# Patient Record
Sex: FEMALE | Race: BLACK OR AFRICAN AMERICAN | NOT HISPANIC OR LATINO | Employment: FULL TIME | RURAL
[De-identification: names, ages, dates, MRNs, and addresses within clinical notes are randomized per-mention and may not be internally consistent; named-entity substitution may affect disease eponyms.]

---

## 2020-08-19 ENCOUNTER — HISTORICAL (OUTPATIENT)
Dept: ADMINISTRATIVE | Facility: HOSPITAL | Age: 50
End: 2020-08-19

## 2020-08-19 LAB — SARS-COV+SARS-COV-2 AG RESP QL IA.RAPID: NEGATIVE

## 2021-01-28 ENCOUNTER — HISTORICAL (OUTPATIENT)
Dept: ADMINISTRATIVE | Facility: HOSPITAL | Age: 51
End: 2021-01-28

## 2021-01-28 LAB — SARS-COV+SARS-COV-2 AG RESP QL IA.RAPID: NEGATIVE

## 2021-02-09 ENCOUNTER — HISTORICAL (OUTPATIENT)
Dept: ADMINISTRATIVE | Facility: HOSPITAL | Age: 51
End: 2021-02-09

## 2021-02-09 LAB
ALBUMIN SERPL BCP-MCNC: 3.7 G/DL (ref 3.5–5)
ALBUMIN/GLOB SERPL: 1 {RATIO}
ALP SERPL-CCNC: 132 U/L (ref 41–108)
ALT SERPL W P-5'-P-CCNC: 19 U/L (ref 13–56)
ANION GAP SERPL CALCULATED.3IONS-SCNC: 10.9 MMOL/L (ref 7–16)
AST SERPL W P-5'-P-CCNC: 17 U/L (ref 15–37)
BACTERIA #/AREA URNS HPF: ABNORMAL /HPF
BASOPHILS # BLD AUTO: 0.05 X10E3/UL (ref 0–0.2)
BASOPHILS NFR BLD AUTO: 0.8 % (ref 0–1)
BILIRUB SERPL-MCNC: 0.3 MG/DL (ref 0–1.2)
BILIRUB UR QL STRIP: NEGATIVE MG/DL
BUN SERPL-MCNC: 9 MG/DL (ref 7–18)
BUN/CREAT SERPL: 11
CALCIUM SERPL-MCNC: 8.2 MG/DL (ref 8.5–10.1)
CHLORIDE SERPL-SCNC: 104 MMOL/L (ref 98–107)
CLARITY UR: CLEAR
CO2 SERPL-SCNC: 30 MMOL/L (ref 21–32)
COLOR UR: YELLOW
CREAT SERPL-MCNC: 0.8 MG/DL (ref 0.5–1.02)
EOSINOPHIL # BLD AUTO: 0.08 X10E3/UL (ref 0–0.5)
EOSINOPHIL NFR BLD AUTO: 1.2 % (ref 1–4)
ERYTHROCYTE [DISTWIDTH] IN BLOOD BY AUTOMATED COUNT: 15.3 % (ref 11.5–14.5)
GLOBULIN SER-MCNC: 3.7 G/DL (ref 2–4)
GLUCOSE SERPL-MCNC: 66 MG/DL (ref 74–106)
GLUCOSE UR STRIP-MCNC: NEGATIVE MG/DL
HCT VFR BLD AUTO: 38.4 % (ref 38–47)
HGB BLD-MCNC: 11.6 G/DL (ref 12–16)
IMM GRANULOCYTES # BLD AUTO: 0.01 X10E3/UL (ref 0–0.04)
IMM GRANULOCYTES NFR BLD: 0.2 % (ref 0–0.4)
KETONES UR STRIP-SCNC: NEGATIVE MG/DL
LEUKOCYTE ESTERASE UR QL STRIP: NEGATIVE LEU/UL
LYMPHOCYTES # BLD AUTO: 1.61 X10E3/UL (ref 1–4.8)
LYMPHOCYTES NFR BLD AUTO: 25 % (ref 27–41)
MCH RBC QN AUTO: 23.8 PG (ref 27–31)
MCHC RBC AUTO-ENTMCNC: 30.2 G/DL (ref 32–36)
MCV RBC AUTO: 78.9 FL (ref 80–96)
MONOCYTES # BLD AUTO: 0.39 X10E3/UL (ref 0–0.8)
MONOCYTES NFR BLD AUTO: 6.1 % (ref 2–6)
MPC BLD CALC-MCNC: 12.7 FL (ref 9.4–12.4)
MUCOUS THREADS #/AREA URNS HPF: ABNORMAL /HPF
NEUTROPHILS # BLD AUTO: 4.29 X10E3/UL (ref 1.8–7.7)
NEUTROPHILS NFR BLD AUTO: 66.7 % (ref 53–65)
NITRITE UR QL STRIP: NEGATIVE
NRBC # BLD AUTO: 0 X10E3/UL (ref 0–0)
NRBC, AUTO (.00): 0 /100 (ref 0–0)
PH UR STRIP: 8 PH UNITS (ref 5–8)
PLATELET # BLD AUTO: 216 X10E3/UL (ref 150–400)
POTASSIUM SERPL-SCNC: 3.9 MMOL/L (ref 3.5–5.1)
PROT SERPL-MCNC: 7.4 G/DL (ref 6.4–8.2)
PROT UR QL STRIP: NEGATIVE MG/DL
RBC # BLD AUTO: 4.87 X10E6/UL (ref 4.2–5.4)
RBC # UR STRIP: NEGATIVE ERY/UL
RBC #/AREA URNS HPF: ABNORMAL /HPF (ref 0–3)
SODIUM SERPL-SCNC: 141 MMOL/L (ref 136–145)
SP GR UR STRIP: 1.01 (ref 1–1.03)
SQUAMOUS #/AREA URNS LPF: ABNORMAL /LPF
TSH SERPL DL<=0.005 MIU/L-ACNC: <0.005 UIU/ML (ref 0.36–3.74)
URATE SERPL-MCNC: 3.8 MG/DL (ref 2.6–6)
UROBILINOGEN UR STRIP-ACNC: 0.2 EU/DL
WBC # BLD AUTO: 6.43 X10E3/UL (ref 4.5–11)
WBC #/AREA URNS HPF: ABNORMAL /HPF (ref 0–5)

## 2021-02-10 LAB — ERYTHROCYTE [SEDIMENTATION RATE] IN BLOOD BY WESTERGREN METHOD: 20 MM/HR (ref 0–30)

## 2021-02-11 ENCOUNTER — HISTORICAL (OUTPATIENT)
Dept: ADMINISTRATIVE | Facility: HOSPITAL | Age: 51
End: 2021-02-11

## 2021-02-11 LAB
ANA SER QL: NEGATIVE
T3 SERPL-MCNC: 110 NG/DL (ref 60–181)
T4 FREE SERPL-MCNC: 1.2 NG/DL (ref 0.76–1.46)

## 2021-03-08 ENCOUNTER — HISTORICAL (OUTPATIENT)
Dept: ADMINISTRATIVE | Facility: HOSPITAL | Age: 51
End: 2021-03-08

## 2021-03-08 LAB
FLUAV AG UPPER RESP QL IA.RAPID: NEGATIVE
FLUBV AG UPPER RESP QL IA.RAPID: NEGATIVE
SARS-COV+SARS-COV-2 AG RESP QL IA.RAPID: NEGATIVE

## 2021-03-30 ENCOUNTER — HOSPITAL ENCOUNTER (OUTPATIENT)
Dept: RADIOLOGY | Facility: HOSPITAL | Age: 51
Discharge: HOME | End: 2021-03-30
Payer: COMMERCIAL

## 2021-03-30 VITALS — BODY MASS INDEX: 38.45 KG/M2 | WEIGHT: 245 LBS | HEIGHT: 67 IN

## 2021-03-30 DIAGNOSIS — Z12.31 VISIT FOR SCREENING MAMMOGRAM: ICD-10-CM

## 2021-03-30 PROCEDURE — 77067 SCR MAMMO BI INCL CAD: CPT | Mod: TC

## 2021-04-06 ENCOUNTER — ANESTHESIA (OUTPATIENT)
Dept: GASTROENTEROLOGY | Facility: HOSPITAL | Age: 51
End: 2021-04-06
Payer: COMMERCIAL

## 2021-04-06 ENCOUNTER — HOSPITAL ENCOUNTER (OUTPATIENT)
Dept: GASTROENTEROLOGY | Facility: HOSPITAL | Age: 51
Discharge: HOME | End: 2021-04-06
Attending: STUDENT IN AN ORGANIZED HEALTH CARE EDUCATION/TRAINING PROGRAM
Payer: COMMERCIAL

## 2021-04-06 ENCOUNTER — ANESTHESIA EVENT (OUTPATIENT)
Dept: GASTROENTEROLOGY | Facility: HOSPITAL | Age: 51
End: 2021-04-06
Payer: COMMERCIAL

## 2021-04-06 VITALS
SYSTOLIC BLOOD PRESSURE: 128 MMHG | DIASTOLIC BLOOD PRESSURE: 92 MMHG | TEMPERATURE: 99 F | RESPIRATION RATE: 18 BRPM | OXYGEN SATURATION: 99 % | HEART RATE: 72 BPM

## 2021-04-06 DIAGNOSIS — Z12.11 COLON CANCER SCREENING: Primary | ICD-10-CM

## 2021-04-06 DIAGNOSIS — Z12.11 COLON CANCER SCREENING: ICD-10-CM

## 2021-04-06 PROCEDURE — G0121 COLON CA SCRN NOT HI RSK IND: HCPCS

## 2021-04-06 PROCEDURE — G0121 COLON CA SCRN NOT HI RSK IND: HCPCS | Mod: ,,, | Performed by: STUDENT IN AN ORGANIZED HEALTH CARE EDUCATION/TRAINING PROGRAM

## 2021-04-06 PROCEDURE — D9220A PRA ANESTHESIA: ICD-10-PCS | Mod: ,,, | Performed by: NURSE ANESTHETIST, CERTIFIED REGISTERED

## 2021-04-06 PROCEDURE — 25000003 PHARM REV CODE 250: Performed by: STUDENT IN AN ORGANIZED HEALTH CARE EDUCATION/TRAINING PROGRAM

## 2021-04-06 PROCEDURE — D9220A PRA ANESTHESIA: Mod: ,,, | Performed by: NURSE ANESTHETIST, CERTIFIED REGISTERED

## 2021-04-06 PROCEDURE — 37000008 HC ANESTHESIA 1ST 15 MINUTES

## 2021-04-06 PROCEDURE — G0121 COLON CA SCRN NOT HI RSK IND: ICD-10-PCS | Mod: ,,, | Performed by: STUDENT IN AN ORGANIZED HEALTH CARE EDUCATION/TRAINING PROGRAM

## 2021-04-06 PROCEDURE — 27201423 OPTIME MED/SURG SUP & DEVICES STERILE SUPPLY

## 2021-04-06 PROCEDURE — 63600175 PHARM REV CODE 636 W HCPCS: Performed by: NURSE ANESTHETIST, CERTIFIED REGISTERED

## 2021-04-06 RX ORDER — LIDOCAINE HYDROCHLORIDE 20 MG/ML
INJECTION, SOLUTION EPIDURAL; INFILTRATION; INTRACAUDAL; PERINEURAL
Status: DISCONTINUED | OUTPATIENT
Start: 2021-04-06 | End: 2021-04-06

## 2021-04-06 RX ORDER — PROPOFOL 10 MG/ML
40 VIAL (ML) INTRAVENOUS ONCE
Status: DISCONTINUED | OUTPATIENT
Start: 2021-04-06 | End: 2021-04-07 | Stop reason: HOSPADM

## 2021-04-06 RX ORDER — LIDOCAINE HYDROCHLORIDE 20 MG/ML
100 INJECTION, SOLUTION INFILTRATION; PERINEURAL ONCE
Status: DISCONTINUED | OUTPATIENT
Start: 2021-04-06 | End: 2021-04-07 | Stop reason: HOSPADM

## 2021-04-06 RX ORDER — HYDROCHLOROTHIAZIDE 25 MG/1
25 TABLET ORAL DAILY
COMMUNITY

## 2021-04-06 RX ORDER — AMLODIPINE BESYLATE 10 MG/1
10 TABLET ORAL DAILY
COMMUNITY

## 2021-04-06 RX ORDER — SODIUM CHLORIDE 0.9 % (FLUSH) 0.9 %
10 SYRINGE (ML) INJECTION
Status: DISCONTINUED | OUTPATIENT
Start: 2021-04-06 | End: 2021-04-07 | Stop reason: HOSPADM

## 2021-04-06 RX ORDER — PROPOFOL 10 MG/ML
VIAL (ML) INTRAVENOUS
Status: DISCONTINUED | OUTPATIENT
Start: 2021-04-06 | End: 2021-04-06

## 2021-04-06 RX ORDER — SODIUM CHLORIDE 9 MG/ML
INJECTION, SOLUTION INTRAVENOUS CONTINUOUS
Status: DISCONTINUED | OUTPATIENT
Start: 2021-04-06 | End: 2021-04-07 | Stop reason: HOSPADM

## 2021-04-06 RX ADMIN — SODIUM CHLORIDE: 9 INJECTION, SOLUTION INTRAVENOUS at 07:04

## 2021-04-06 RX ADMIN — LIDOCAINE HYDROCHLORIDE 100 MG: 20 INJECTION, SOLUTION EPIDURAL; INFILTRATION; INTRACAUDAL; PERINEURAL at 07:04

## 2021-04-06 RX ADMIN — PROPOFOL 200 MG: 10 INJECTION, EMULSION INTRAVENOUS at 07:04

## 2024-11-25 ENCOUNTER — HOSPITAL ENCOUNTER (OUTPATIENT)
Dept: RADIOLOGY | Facility: HOSPITAL | Age: 54
Discharge: HOME | End: 2024-11-25
Attending: NURSE PRACTITIONER
Payer: COMMERCIAL

## 2024-11-25 DIAGNOSIS — M25.569 PAIN IN UNSPECIFIED KNEE: ICD-10-CM

## 2024-11-25 PROCEDURE — 73560 X-RAY EXAM OF KNEE 1 OR 2: CPT | Mod: TC,RT

## 2024-11-25 PROCEDURE — 73560 X-RAY EXAM OF KNEE 1 OR 2: CPT | Mod: 26,RT,, | Performed by: RADIOLOGY

## 2024-11-25 PROCEDURE — 73560 X-RAY EXAM OF KNEE 1 OR 2: CPT | Mod: TC,LT

## 2024-11-25 PROCEDURE — 73560 X-RAY EXAM OF KNEE 1 OR 2: CPT | Mod: 26,LT,, | Performed by: RADIOLOGY

## 2024-12-13 ENCOUNTER — HOSPITAL ENCOUNTER (EMERGENCY)
Facility: HOSPITAL | Age: 54
Discharge: HOME | End: 2024-12-13
Payer: COMMERCIAL

## 2024-12-13 VITALS
SYSTOLIC BLOOD PRESSURE: 155 MMHG | DIASTOLIC BLOOD PRESSURE: 95 MMHG | HEART RATE: 72 BPM | WEIGHT: 287 LBS | BODY MASS INDEX: 43.5 KG/M2 | OXYGEN SATURATION: 100 % | HEIGHT: 68 IN | TEMPERATURE: 98 F | RESPIRATION RATE: 17 BRPM

## 2024-12-13 DIAGNOSIS — R52 PAIN: ICD-10-CM

## 2024-12-13 DIAGNOSIS — M16.0 PRIMARY OSTEOARTHRITIS OF BOTH HIPS: Primary | ICD-10-CM

## 2024-12-13 PROCEDURE — 63600175 PHARM REV CODE 636 W HCPCS: Performed by: NURSE PRACTITIONER

## 2024-12-13 PROCEDURE — 96372 THER/PROPH/DIAG INJ SC/IM: CPT | Performed by: NURSE PRACTITIONER

## 2024-12-13 PROCEDURE — 99284 EMERGENCY DEPT VISIT MOD MDM: CPT | Mod: 25

## 2024-12-13 PROCEDURE — 25000003 PHARM REV CODE 250: Performed by: NURSE PRACTITIONER

## 2024-12-13 RX ORDER — HYDROCODONE BITARTRATE AND ACETAMINOPHEN 10; 325 MG/1; MG/1
1 TABLET ORAL
Status: COMPLETED | OUTPATIENT
Start: 2024-12-13 | End: 2024-12-13

## 2024-12-13 RX ORDER — METHYLPREDNISOLONE ACETATE 80 MG/ML
80 INJECTION, SUSPENSION INTRA-ARTICULAR; INTRALESIONAL; INTRAMUSCULAR; SOFT TISSUE
Status: COMPLETED | OUTPATIENT
Start: 2024-12-13 | End: 2024-12-13

## 2024-12-13 RX ORDER — MORPHINE SULFATE 4 MG/ML
4 INJECTION, SOLUTION INTRAMUSCULAR; INTRAVENOUS
Status: COMPLETED | OUTPATIENT
Start: 2024-12-13 | End: 2024-12-13

## 2024-12-13 RX ADMIN — METHYLPREDNISOLONE ACETATE 80 MG: 80 INJECTION, SUSPENSION INTRA-ARTICULAR; INTRALESIONAL; INTRAMUSCULAR; SOFT TISSUE at 06:12

## 2024-12-13 RX ADMIN — MORPHINE SULFATE 4 MG: 4 INJECTION, SOLUTION INTRAMUSCULAR; INTRAVENOUS at 06:12

## 2024-12-13 RX ADMIN — HYDROCODONE BITARTRATE AND ACETAMINOPHEN 1 TABLET: 10; 325 TABLET ORAL at 06:12

## 2024-12-13 NOTE — ED TRIAGE NOTES
Presents to ED for complaints of bilateral hip pain.  Patient reports that her hips have been hurting for 2-3 days now.  Recently diagnosed with arthritis in her knee and patient wonders if this could have moved to her hips.  Patient reports left hip worse than right and denies any injury.

## 2024-12-16 NOTE — ED PROVIDER NOTES
Encounter Date: 12/13/2024       History     Chief Complaint   Patient presents with    Hip Pain     Pt presents to ED via POV with c/o bilateral hip pain more on the left. Pt reports hx of arthritis in bilateral knees.      54 year old female presents to ED with complaint of bilateral hip pain. Patient states pain started approximately 4 days ago. Pain is reportedly worse on the left than right. Denies fall or injury. She reports having issues with her knees and having x-rays done recently noting arthritis to her knees. She states she is taking Tylenol for pain without relief of symptoms.     The history is provided by the patient.     Review of patient's allergies indicates:   Allergen Reactions    Anaprox [naproxen sodium] Swelling     Past Medical History:   Diagnosis Date    Hypertension      Past Surgical History:   Procedure Laterality Date    BILATERAL TUBAL LIGATION      BREAST BIOPSY       No family history on file.  Social History     Tobacco Use    Smoking status: Every Day    Smokeless tobacco: Never   Substance Use Topics    Alcohol use: Never    Drug use: Never     Review of Systems   Constitutional:  Negative for chills and fever.   Eyes:  Negative for photophobia and visual disturbance.   Respiratory:  Negative for cough and shortness of breath.    Gastrointestinal:  Negative for nausea and vomiting.   Genitourinary:  Negative for decreased urine volume and dysuria.   Musculoskeletal:  Positive for arthralgias and gait problem.   Skin:  Negative for color change and wound.   Neurological:  Negative for dizziness and weakness.   Hematological:  Negative for adenopathy. Does not bruise/bleed easily.   Psychiatric/Behavioral:  Negative for agitation and confusion.    All other systems reviewed and are negative.      Physical Exam     Initial Vitals [12/13/24 1722]   BP Pulse Resp Temp SpO2   (!) 155/95 72 16 98.1 °F (36.7 °C) 100 %      MAP       --         Physical Exam    Nursing note and vitals  reviewed.  Constitutional: She appears well-developed and well-nourished.   HENT:   Head: Normocephalic and atraumatic.   Eyes: EOM are normal. Pupils are equal, round, and reactive to light.   Neck: Neck supple.   Normal range of motion.  Cardiovascular:  Normal rate and regular rhythm.           No murmur heard.  Pulmonary/Chest: No respiratory distress. She has no wheezes.   Abdominal: Abdomen is soft. She exhibits no distension. There is no abdominal tenderness.   Musculoskeletal:         General: Tenderness present. No edema.      Cervical back: Normal range of motion and neck supple.      Right hip: Tenderness present.      Left hip: Tenderness present.     Lymphadenopathy:     She has no cervical adenopathy.   Neurological: She is alert and oriented to person, place, and time. No cranial nerve deficit or sensory deficit.   Skin: Skin is warm and dry. Capillary refill takes less than 2 seconds.   Psychiatric: She has a normal mood and affect. Thought content normal.         Medical Screening Exam   See Full Note    ED Course   Procedures  Labs Reviewed - No data to display       Imaging Results              X-Ray Hips Bilateral 2 View Incl AP Pelvis (Final result)  Result time 12/13/24 18:32:09      Final result by Lawrence Driver MD (12/13/24 18:32:09)                   Impression:      No acute radiographic abnormality.      Electronically signed by: Lawrence Driver  Date:    12/13/2024  Time:    18:32               Narrative:    EXAMINATION:  XR HIPS BILATERAL 2 VIEW INCL AP PELVIS    CLINICAL HISTORY:  Pain, unspecified    TECHNIQUE:  AP view of the pelvis and frogleg lateral views of both hips were performed.    COMPARISON:  None.    FINDINGS:  The femoral heads appear normally positioned.  No acute fracture, subluxation or dislocation.  Mild to moderate osteophytic spurring of the acetabulum bilaterally left greater than right.  Joint space appears adequately maintained.  SI joints are intact.  Few  calcified phleboliths in the pelvis.    No osseous destruction.                                       Medications   morphine injection 4 mg (4 mg Intramuscular Given 12/13/24 1858)   methylPREDNISolone acetate injection 80 mg (80 mg Intramuscular Given 12/13/24 1857)   HYDROcodone-acetaminophen  mg per tablet 1 tablet (1 tablet Oral Given 12/13/24 1858)     Medical Decision Making  54 year old female presents to ED with complaint of bilateral hip pain. Patient states pain started approximately 4 days ago. Pain is reportedly worse on the left than right. Denies fall or injury. She reports having issues with her knees and having x-rays done recently noting arthritis to her knees. She states she is taking Tylenol for pain without relief of symptoms.     Diagnostic ordered and reviewed by me  Radiologist interpretation reviewed  IM Morphine, Depo medrol, Norco administered  Patient reports recent Ultram prescription provided; needing to find licenses to obtain medicine  Ortho referral placed    Amount and/or Complexity of Data Reviewed  Radiology: ordered.    Risk  Prescription drug management.                                      Clinical Impression:   Final diagnoses:  [R52] Pain  [M16.0] Primary osteoarthritis of both hips (Primary)        ED Disposition Condition    Discharge Stable          ED Prescriptions    None       Follow-up Information    None          Sylvia Mejia, FNP  12/15/24 [id number]

## 2025-01-10 ENCOUNTER — HOSPITAL ENCOUNTER (OUTPATIENT)
Dept: RADIOLOGY | Facility: HOSPITAL | Age: 55
Discharge: HOME | End: 2025-01-10
Attending: ORTHOPAEDIC SURGERY
Payer: COMMERCIAL

## 2025-01-10 ENCOUNTER — OFFICE VISIT (OUTPATIENT)
Dept: ORTHOPEDICS | Facility: CLINIC | Age: 55
End: 2025-01-10
Payer: COMMERCIAL

## 2025-01-10 DIAGNOSIS — M54.50 CHRONIC BILATERAL LOW BACK PAIN, UNSPECIFIED WHETHER SCIATICA PRESENT: ICD-10-CM

## 2025-01-10 DIAGNOSIS — M54.16 LUMBAR RADICULOPATHY: ICD-10-CM

## 2025-01-10 DIAGNOSIS — G89.29 CHRONIC BILATERAL LOW BACK PAIN, UNSPECIFIED WHETHER SCIATICA PRESENT: ICD-10-CM

## 2025-01-10 DIAGNOSIS — G89.29 CHRONIC BILATERAL LOW BACK PAIN, UNSPECIFIED WHETHER SCIATICA PRESENT: Primary | ICD-10-CM

## 2025-01-10 DIAGNOSIS — M54.50 CHRONIC BILATERAL LOW BACK PAIN, UNSPECIFIED WHETHER SCIATICA PRESENT: Primary | ICD-10-CM

## 2025-01-10 DIAGNOSIS — M16.0 PRIMARY OSTEOARTHRITIS OF BOTH HIPS: ICD-10-CM

## 2025-01-10 PROCEDURE — 72110 X-RAY EXAM L-2 SPINE 4/>VWS: CPT | Mod: 26,,, | Performed by: RADIOLOGY

## 2025-01-10 PROCEDURE — 72110 X-RAY EXAM L-2 SPINE 4/>VWS: CPT | Mod: TC

## 2025-01-10 PROCEDURE — 99203 OFFICE O/P NEW LOW 30 MIN: CPT | Mod: S$PBB,,, | Performed by: ORTHOPAEDIC SURGERY

## 2025-01-10 PROCEDURE — 99999 PR PBB SHADOW E&M-EST. PATIENT-LVL III: CPT | Mod: PBBFAC,,, | Performed by: ORTHOPAEDIC SURGERY

## 2025-01-10 PROCEDURE — 99213 OFFICE O/P EST LOW 20 MIN: CPT | Mod: PBBFAC | Performed by: ORTHOPAEDIC SURGERY

## 2025-01-10 RX ORDER — NABUMETONE 750 MG/1
750 TABLET, FILM COATED ORAL 2 TIMES DAILY
Qty: 60 TABLET | Refills: 2 | Status: SHIPPED | OUTPATIENT
Start: 2025-01-10

## 2025-01-10 NOTE — PROGRESS NOTES
CC:   Chief Complaint   Patient presents with    Left Hip - Pain    Right Hip - Pain        PREVIOUS INFO:        HISTORY:   1/10/2025    Halle Eric  is a 54 y.o. patient has a history of some knee pain but she is having any worsening buttock pain bilaterally she went to the emergency room for and was diagnosed with hip DJD but this actually appears to be her low back with the buttock pain she is on a cane she has a history of having injections of her back years ago but she has not seen anybody recently      PAST MEDICAL HISTORY:   Past Medical History:   Diagnosis Date    Hypertension           PAST SURGICAL HISTORY:   Past Surgical History:   Procedure Laterality Date    BILATERAL TUBAL LIGATION      BREAST BIOPSY            ALLERGIES:   Review of patient's allergies indicates:   Allergen Reactions    Anaprox [naproxen sodium] Swelling        MEDICATIONS :    Current Outpatient Medications:     amLODIPine (NORVASC) 10 MG tablet, Take 10 mg by mouth once daily., Disp: , Rfl:     hydroCHLOROthiazide (HYDRODIURIL) 25 MG tablet, Take 25 mg by mouth once daily., Disp: , Rfl:      SOCIAL HISTORY:   Social History     Socioeconomic History    Marital status:    Tobacco Use    Smoking status: Every Day    Smokeless tobacco: Never   Substance and Sexual Activity    Alcohol use: Never    Drug use: Never        ROS    FAMILY HISTORY: No family history on file.       PHYSICAL EXAM: There were no vitals filed for this visit.            There is no height or weight on file to calculate BMI.     In general, this is a well-developed, well-nourished female . The patient is alert, oriented and cooperative.      HEENT:  Normocephalic, atraumatic.  Extraocular movements are intact bilaterally.  The oropharynx is benign.       NECK:  Nontender with good range of motion.      PULMONARY: Respirations are even and non-labored.       CARDIOVASCULAR: Pulses regular by peripheral palpation.       ABDOMEN:  Soft,  non-tender, non-distended.        EXTREMITIES:  She is out palpable dorsalis pedis pulse bilaterally the knees motion 0 to about 120 or 30 bilaterally mild pain the left hip you can forward flex up to 90 50° external rotation 10° internal rotation lower lumbar spine has pain    Ortho Exam  5 ft 8 in  287 lb  BMI of 43.6    RADIOGRAPHIC FINDINGS:  X-rays from the emergency room of her hips show maybe mild-to-moderate DJD of the hips with a severe DJD of the lower lumbar spine      .      IMPRESSION:  Appears to be having significant back symptoms she does have some arthritis of the knees and hips but not severe enough to recommend joint replacement  She is morbidly overweight needs to lose weight  Will MRI her lumbar spine and get her to a spine doctor      PLAN:  MRI lumbar spine we will obtain official set of lumbar spine x-rays currently  Knee sleeve with the medical doctor about diet program  The diclofenac is not helping  And we will write a prescription for Relafen she says she can tolerate anti-inflammatories she has any problems stop it immediately        No follow-ups on file.         Adán Schuster III      (Subject to voice recognition error, transcription service not allowed)

## 2025-02-04 ENCOUNTER — TELEPHONE (OUTPATIENT)
Dept: ORTHOPEDICS | Facility: CLINIC | Age: 55
End: 2025-02-04
Payer: COMMERCIAL

## 2025-02-04 DIAGNOSIS — G89.29 CHRONIC BILATERAL LOW BACK PAIN, UNSPECIFIED WHETHER SCIATICA PRESENT: Primary | ICD-10-CM

## 2025-02-04 DIAGNOSIS — M54.50 CHRONIC BILATERAL LOW BACK PAIN, UNSPECIFIED WHETHER SCIATICA PRESENT: Primary | ICD-10-CM

## 2025-03-05 ENCOUNTER — TELEPHONE (OUTPATIENT)
Dept: ORTHOPEDICS | Facility: CLINIC | Age: 55
End: 2025-03-05
Payer: COMMERCIAL

## 2025-03-05 NOTE — TELEPHONE ENCOUNTER
----- Message from Jaki sent at 3/5/2025 12:39 PM CST -----  Regarding: Referral Info  Who Called: Halle Summers is requesting more info on the referral that was put in in February Preferred Method of Contact: Phone CallPatient's Preferred Phone Number on File: [phone]

## 2025-04-15 RX ORDER — NABUMETONE 750 MG/1
750 TABLET, FILM COATED ORAL 2 TIMES DAILY
Qty: 60 TABLET | Refills: 2 | Status: SHIPPED | OUTPATIENT
Start: 2025-04-15

## 2025-05-14 DIAGNOSIS — M54.16 LUMBAR RADICULOPATHY: Primary | ICD-10-CM

## 2025-05-15 ENCOUNTER — CLINICAL SUPPORT (OUTPATIENT)
Dept: REHABILITATION | Facility: HOSPITAL | Age: 55
End: 2025-05-15
Payer: COMMERCIAL

## 2025-05-15 DIAGNOSIS — M54.16 LUMBAR RADICULOPATHY: Primary | ICD-10-CM

## 2025-05-15 PROCEDURE — 97161 PT EVAL LOW COMPLEX 20 MIN: CPT

## 2025-05-15 NOTE — PROGRESS NOTES
"  Outpatient Rehab    Physical Therapy Evaluation    Patient Name: Halle Eric  MRN: [id number]  YOB: 1970  Encounter Date: 5/15/2025    Therapy Diagnosis:   Encounter Diagnosis   Name Primary?    Lumbar radiculopathy Yes     Physician: Zoya Velásquez FNP    Physician Orders: Eval and Treat  Medical Diagnosis: Lumbar radiculopathy    Visit # / Visits Authorized:  1 / 1  Insurance Authorization Period: 5/14/2025 to 5/14/2026  Date of Evaluation: 5/15/2025  Plan of Care Certification: 5/15/2025 to 7/11/2025      Time In: 0832   Time Out: 0915  Total Time (in minutes): 43   Total Billable Time (in minutes): 43    Intake Outcome Measure for FOTO Survey    Therapist reviewed FOTO scores for Halle Eric on 5/15/2025.   FOTO report - see Media section or FOTO account episode details.     Intake Score: 12%    Precautions:       Subjective   History of Present Illness  Halle is a 54 y.o. female who reports to physical therapy with a chief concern of Lumbar Radiculopathy.     The patient reports a medical diagnosis of Lumbar Radiculopathy.    Diagnostic tests related to this condition: MRI studies.   MRI Studies Details: Impression:     1. 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  Potential future intervention should correlate with the vertebral numbering reported on this exam.  2. Lumbar spondylosis as detailed above.  Multilevel mild-to-moderate neural foraminal narrowing.  No spinal canal stenosis.    History of Present Condition/Illness: Patient has had back pain for years but it has worsened now to the point that the patient states she is "incapacitated." MRI has been performed showing lumbarization of the S1, bulging discs, bone spurs, and spondylosis.. She reports she cannot stand for long at all due to the pain and she has to sit to perform all activities. She sits to cook and has to use a shower chair to shower. She " even has difficulty bending over to put on her shoes. She is on leave from work as she can no longer work at this time. She has seen Dr Jakob Franz, [facility], and Dr Franz sent her to the pain treatment center. The plan at this time is to try Physical Therapy, Injections, and then likely surgery.      Activities of Daily Living  Social history was obtained from Patient.    General Prior Level of Function Comments: Independent  General Current Level of Function Comments: Must sit for all ADLs           Pain     Patient reports a current pain level of 5/10. Pain at best is reported as 2/10. Pain at worst is reported as 10/10.   Location: Low Back and Left leg  Clinical Progression (since onset): Worsening  Pain Qualities: Pulling, Sharp, Aching, Burning, Other (Comment), Radiating  Other Pain Qualities: Sharp, shooting down the leg. Numbness and tingling down the Left leg all the way to her foot  Pain-Relieving Factors: Activity modification, Change in position, Medications - prescription, Medications - over-the-counter, Rest, Heat, Lying down  Pain-Aggravating Factors: Walking, Standing, Cooking, Movement, Reaching         Living Arrangements  Living Situation  Housing: Home independently  Living Arrangements: Spouse/significant other        Employment  Patient does not report that: Does the patient's condition impact their ability to work?      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    Halle Eric  has a past surgical history that includes Breast biopsy and Bilateral tubal ligation.    Halle has a current medication list which includes the following prescription(s): amlodipine, hydrochlorothiazide, and nabumetone.    Review of patient's allergies indicates:   Allergen Reactions    Anaprox [naproxen sodium] Swelling        Objective   Posture  Patient presents with a Forward head position.  Increased lumbar lordosis and Swayback is observed.   Shoulders are Rounded.             Spinal Muscle Palpation             Muscle spasms along the paraspinals. Tender to palpation along central spine, paraspinals, Left Piriformis, and Left PSIS        Lumbar Range of Motion   Active (deg) Passive (deg) Pain   Flexion 30   Yes   Extension -15   Yes   Right Lateral Flexion 15   Yes   Right Rotation 20       Left Lateral Flexion 15   Yes   Left Rotation 20                Hip Range of Motion   Right Hip   Active (deg) Passive (deg) Pain   Flexion 120       Extension 10       ABduction         ADduction         External Rotation 90/90         External Rotation Prone         Internal Rotation 90/90         Internal Rotation Prone             Left Hip   Active (deg) Passive (deg) Pain   Flexion 100       Extension 5       ABduction         ADduction         External Rotation 90/90         External Rotation Prone         Internal Rotation 90/90         Internal Rotation Prone                      Thoracic Trunk Strength  3+/5    Lumbar Strength   Strength Pain   Flexion 3+ Yes   Extension 3+ Yes   Right Lateral Flexion 3     Left Lateral Flexion 3     Right Rotation 3     Left Rotation 3     Transverse Abdominus Strength Testing: 3/5            Hip Strength - Planes of Motion   Right Strength Right Pain Left Strength Left  Pain   Flexion (L2) 4+   3     Extension 4+   3     ABduction 4+   3     ADduction 4+   3     Internal Rotation 4+   3     External Rotation 4+   3         Knee Strength   Right Strength Right Pain Left Strength Left  Pain   Flexion (S2) 4+   4-     Prone Flexion           Extension (L3) 4+   4-                Lumbar/Pelvic Girdle Special Tests  Lumbar Tests - Repeated  Positive: Flexion and Extension       Lumbar Tests - SLR and Tension  Positive: Left Passive Straight Leg Raise and Left Crossed Straight Leg Raise                          Ambulation Assistance Required  Surface With  Assistive Device  Without Assistive Device Details   Level Independent        Uneven         Curb           Gait Analysis  Gait Pattern: Antalgic, Waddling            Trunk Observations During Gait: Right lateral lean over stance limb    Hip Observations During Gait  Left: Hip Circumducted  Knee Observations During Gait  Left: Decreased Knee Flexion in Swing and Decreased Knee Flexion Loading Response  Gait Analysis Details  Patient keeps Left knee extended throughout gait causing circumduction rather than a normal swing through pattern. She reports pain in the Left Knee and in her back upon loading the Left Lower Extremity during stance phase. She requires use of a cane for ambulation at this time.          Treatment:       Time Entry(in minutes):  PT Evaluation (Low) Time Entry: 43    Assessment & Plan   Assessment  Halle presents with a condition of Low complexity.   Presentation of Symptoms: Stable  Will Comorbidities Impact Care: No       Functional Limitations: Activity tolerance, Completing work/school activities, Pain when reaching, Pain with ADLs/IADLs, Sitting tolerance, Disrupted sleep pattern, Range of motion, Performing household chores, Participating in leisure activities, Painful locomotion/ambulation, Carrying objects, Decreased ambulation distance/endurance, Standing tolerance, Gait limitations, Increased risk of fall, Maintaining balance, Transfers  Impairments: Activity intolerance, Abnormal or restricted range of motion, Impaired physical strength, Lack of appropriate home exercise program, Pain with functional activity, Abnormal muscle firing, Abnormal muscle tone, Abnormal gait  Personal Factors Affecting Prognosis: Pain    Patient Goal for Therapy (PT): I just want my life back. I want to be able to walk and take care of myself.  Prognosis: Guarded  Prognosis Details: Patient will likely require injections and surgery due to the significant pain and deficits from her back  Assessment Details: Patient  demonstrates deficits with range of motion, strength, and function that limit ability to participate in ADLs, work, and recreational activities. They would benefit from skilled PT services to normalize kinetic chain mobility, strength, and function to safely return to their prior level of activity.    Plan  From a physical therapy perspective, the patient would benefit from: Skilled Rehab Services    Planned therapy interventions include: Therapeutic exercise, Therapeutic activities, Neuromuscular re-education, Manual therapy, Gait training, and Aquatic therapy.    Planned modalities to include: Electrical stimulation - passive/unattended, Cryotherapy (cold pack), Vasopneumatic pump, Thermotherapy (hot pack), Mechanical traction, and Other (Comment). Dry Needling      Visit Frequency: 2 times Per Week for 8 Weeks.       This plan was discussed with Patient.   Discussion participants: Agreed Upon Plan of Care  Plan details: Patient will benefit from skilled Physical Therapy intervention to address all deficits and help patient to return to their prior level of function.  Sup Visit performed today with YUSRA Terry, YUSRA Morris, and YUSRA Davidson.  All goals and treatment plan reviewed. Will work toward completion of all goals set.           Patient's spiritual, cultural, and educational needs considered and patient agreeable to plan of care and goals.           Goals:   Active       Functional outcome       Patient stated goal: I just want my life back. I want to be able to walk and take care of myself.        Start:  05/15/25    Expected End:  07/11/25            Patient will demonstrate independence in home program for support of progression       Start:  05/15/25    Expected End:  06/13/25               Pain       Patient will report pain of 3/10 demonstrating a reduction of overall pain       Start:  05/15/25    Expected End:  07/11/25               Range of Motion       Patient will achieve  spinal flexion to 40 degrees        Start:  05/15/25    Expected End:  07/11/25            Patient will achieve spinal extension to neutral       Start:  05/15/25    Expected End:  07/11/25               Strength       Patient will demonstrate 3+/5 abdominal strength       Start:  05/15/25    Expected End:  07/11/25            Patient will demonstrate 4/5 spinal strength       Start:  05/15/25    Expected End:  07/11/25            Patient will achieve left hip flexion strength of 4/5       Start:  05/15/25    Expected End:  07/11/25            Patient will achieve left knee extension strength of 4/5       Start:  05/15/25    Expected End:  07/11/25                ELIEL MARTINEZ, PT, DPT        Clinical Information for Insurance Authorization     Dates of Services: 5/15/2025 to 7/11/2025  Discharge Plan: Patient will be discharged from skilled physical therapy treatment once all goals have been met, patient has plateaued, or physician/insurance requests discontinuation of care. Patient will be discharged with a home exercise program.   Type of therapy: Rehabilitative  ICD-10 Diagnosis Code(s): M54.16  Which side is symptomatic? Not applicable and/or symptoms are not localized  Surgical: No  Surgical procedure: N/A  Surgery date: N/A.  Presenting symptoms/diagnoses are repetitive in nature.  Presenting symptoms are non-radiating in nature.   The rehabilitation is not related to a diagnosis of cancer.  The rehabilitation is not related to a diagnosis of lymphedema.  Patient's clinical presentation is:  Severe objective and functional deficits: consistent intense symptoms with severe loss of range of motion, strength, or ability to perform daily tasks  CPT Codes Requested:  67020 [therapeutic exercise], 83158 [neuromuscular re-education], 88318 [gait training], 16271 [manual therapy], 60145 [therapeutic activities], 46309 [aquatic therapy], 16084 [unattended electrical stimulation], and 25260 [mechanical traction]

## 2025-05-15 NOTE — LETTER
May 15, 2025  NICK Jacome  [address] [facility] [place] [address]    To whom it may concern,     The attached plan of care is being sent to you for review and reference.    You may indicate your approval by signing the document electronically, or by faxing/mailing a signed copy of the final page of this document back to the attention of ZOYA MARTINEZ PT, DPT:         Plan of Care 5/15/25   Effective from: 5/15/2025  Effective to: 7/11/2025    Plan ID: [id number]            Participants as of Finalize on 5/15/2025    Name Type Comments Contact Info    NICK Jacome Referring Provider  [phone]       Last Plan Note     Author: Zoya Martinez PT DPT Status: Signed Last edited: 5/15/2025  8:30 AM         Outpatient Rehab    Physical Therapy Evaluation    Patient Name: Halle Eric  MRN: [id number]  YOB: 1970  Encounter Date: 5/15/2025    Therapy Diagnosis:   Encounter Diagnosis   Name Primary?    Lumbar radiculopathy Yes     Physician: Zoya Velásquez FNP    Physician Orders: Eval and Treat  Medical Diagnosis: Lumbar radiculopathy    Visit # / Visits Authorized:  1 / 1  Insurance Authorization Period: 5/14/2025 to 5/14/2026  Date of Evaluation: 5/15/2025  Plan of Care Certification: 5/15/2025 to 7/11/2025      Time In: 0832   Time Out: 0915  Total Time (in minutes): 43   Total Billable Time (in minutes): 43    Intake Outcome Measure for FOTO Survey    Therapist reviewed FOTO scores for Halle Eric on 5/15/2025.   FOTO report - see Media section or FOTO account episode details.     Intake Score: 12%    Precautions:       Subjective   History of Present Illness  Halle is a 54 y.o. female who reports to physical therapy with a chief concern of Lumbar Radiculopathy.     The patient reports a medical diagnosis of Lumbar Radiculopathy.    Diagnostic tests related to this condition: MRI studies.   MRI Studies Details: Impression:     1. 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  Potential future intervention should correlate with the vertebral numbering reported on this exam.  2. Lumbar spondylosis as detailed above.  Multilevel mild-to-moderate neural foraminal narrowing.  No spinal canal stenosis.    History of Present Condition/Illness: Patient has had back pain for years but it has worsened now to the point that the patient states she is "incapacitated." MRI has been performed showing lumbarization of the S1, bulging discs, bone spurs, and spondylosis.. She reports she cannot stand for long at all due to the pain and she has to sit to perform all activities. She sits to cook and has to use a shower chair to shower. She even has difficulty bending over to put on her shoes. She is on leave from work as she can no longer work at this time. She has seen Dr Jakob Franz, [facility], and Dr Franz sent her to the pain treatment center. The plan at this time is to try Physical Therapy, Injections, and then likely surgery.      Activities of Daily Living  Social history was obtained from Patient.    General Prior Level of Function Comments: Independent  General Current Level of Function Comments: Must sit for all ADLs           Pain     Patient reports a current pain level of 5/10. Pain at best is reported as 2/10. Pain at worst is reported as 10/10.   Location: Low Back and Left leg  Clinical Progression (since onset): Worsening  Pain Qualities: Pulling, Sharp, Aching, Burning, Other (Comment), Radiating  Other Pain Qualities: Sharp, shooting down the leg. Numbness and tingling down the Left leg all the way to her foot  Pain-Relieving Factors: Activity modification, Change in position, Medications - prescription, Medications - over-the-counter, Rest, Heat, Lying down  Pain-Aggravating Factors: Walking, Standing, Cooking, Movement, Reaching         Living Arrangements  Living Situation  Housing: Home " independently  Living Arrangements: Spouse/significant other        Employment  Patient does not report that: Does the patient's condition impact their ability to work?      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    Halle Eric  has a past surgical history that includes Breast biopsy and Bilateral tubal ligation.    Halle has a current medication list which includes the following prescription(s): amlodipine, hydrochlorothiazide, and nabumetone.    Review of patient's allergies indicates:   Allergen Reactions    Anaprox [naproxen sodium] Swelling        Objective   Posture  Patient presents with a Forward head position. Increased lumbar lordosis and Swayback is observed.   Shoulders are Rounded.             Spinal Muscle Palpation             Muscle spasms along the paraspinals. Tender to palpation along central spine, paraspinals, Left Piriformis, and Left PSIS        Lumbar Range of Motion   Active (deg) Passive (deg) Pain   Flexion 30   Yes   Extension -15   Yes   Right Lateral Flexion 15   Yes   Right Rotation 20       Left Lateral Flexion 15   Yes   Left Rotation 20                Hip Range of Motion   Right Hip   Active (deg) Passive (deg) Pain   Flexion 120       Extension 10       ABduction         ADduction         External Rotation 90/90         External Rotation Prone         Internal Rotation 90/90         Internal Rotation Prone             Left Hip   Active (deg) Passive (deg) Pain   Flexion 100       Extension 5       ABduction         ADduction         External Rotation 90/90         External Rotation Prone         Internal Rotation 90/90         Internal Rotation Prone                      Thoracic Trunk Strength  3+/5    Lumbar Strength   Strength Pain   Flexion 3+ Yes   Extension 3+ Yes   Right Lateral Flexion 3     Left Lateral Flexion 3     Right Rotation 3     Left  Rotation 3     Transverse Abdominus Strength Testing: 3/5            Hip Strength - Planes of Motion   Right Strength Right Pain Left Strength Left  Pain   Flexion (L2) 4+   3     Extension 4+   3     ABduction 4+   3     ADduction 4+   3     Internal Rotation 4+   3     External Rotation 4+   3         Knee Strength   Right Strength Right Pain Left Strength Left  Pain   Flexion (S2) 4+   4-     Prone Flexion           Extension (L3) 4+   4-                Lumbar/Pelvic Girdle Special Tests  Lumbar Tests - Repeated  Positive: Flexion and Extension       Lumbar Tests - SLR and Tension  Positive: Left Passive Straight Leg Raise and Left Crossed Straight Leg Raise                          Ambulation Assistance Required  Surface With  Assistive Device Without Assistive Device Details   Level Independent        Uneven         Curb           Gait Analysis  Gait Pattern: Antalgic, Waddling            Trunk Observations During Gait: Right lateral lean over stance limb    Hip Observations During Gait  Left: Hip Circumducted  Knee Observations During Gait  Left: Decreased Knee Flexion in Swing and Decreased Knee Flexion Loading Response  Gait Analysis Details  Patient keeps Left knee extended throughout gait causing circumduction rather than a normal swing through pattern. She reports pain in the Left Knee and in her back upon loading the Left Lower Extremity during stance phase. She requires use of a cane for ambulation at this time.          Treatment:       Time Entry(in minutes):  PT Evaluation (Low) Time Entry: 43    Assessment & Plan   Assessment  Halle presents with a condition of Low complexity.   Presentation of Symptoms: Stable  Will Comorbidities Impact Care: No       Functional Limitations: Activity tolerance, Completing work/school activities, Pain when reaching, Pain with ADLs/IADLs, Sitting tolerance, Disrupted sleep pattern, Range of motion, Performing household chores, Participating in leisure activities,  Painful locomotion/ambulation, Carrying objects, Decreased ambulation distance/endurance, Standing tolerance, Gait limitations, Increased risk of fall, Maintaining balance, Transfers  Impairments: Activity intolerance, Abnormal or restricted range of motion, Impaired physical strength, Lack of appropriate home exercise program, Pain with functional activity, Abnormal muscle firing, Abnormal muscle tone, Abnormal gait  Personal Factors Affecting Prognosis: Pain    Patient Goal for Therapy (PT): I just want my life back. I want to be able to walk and take care of myself.  Prognosis: Guarded  Prognosis Details: Patient will likely require injections and surgery due to the significant pain and deficits from her back  Assessment Details: Patient demonstrates deficits with range of motion, strength, and function that limit ability to participate in ADLs, work, and recreational activities. They would benefit from skilled PT services to normalize kinetic chain mobility, strength, and function to safely return to their prior level of activity.    Plan  From a physical therapy perspective, the patient would benefit from: Skilled Rehab Services    Planned therapy interventions include: Therapeutic exercise, Therapeutic activities, Neuromuscular re-education, Manual therapy, Gait training, and Aquatic therapy.    Planned modalities to include: Electrical stimulation - passive/unattended, Cryotherapy (cold pack), Vasopneumatic pump, Thermotherapy (hot pack), Mechanical traction, and Other (Comment). Dry Needling      Visit Frequency: 2 times Per Week for 8 Weeks.       This plan was discussed with Patient.   Discussion participants: Agreed Upon Plan of Care  Plan details: Patient will benefit from skilled Physical Therapy intervention to address all deficits and help patient to return to their prior level of function.  Sup Visit performed today with YUSRA Terry, YUSRA Morris, and YUSRA Davidson.  All goals and  treatment plan reviewed. Will work toward completion of all goals set.           Patient's spiritual, cultural, and educational needs considered and patient agreeable to plan of care and goals.           Goals:   Active       Functional outcome       Patient stated goal: I just want my life back. I want to be able to walk and take care of myself.        Start:  05/15/25    Expected End:  07/11/25            Patient will demonstrate independence in home program for support of progression       Start:  05/15/25    Expected End:  06/13/25               Pain       Patient will report pain of 3/10 demonstrating a reduction of overall pain       Start:  05/15/25    Expected End:  07/11/25               Range of Motion       Patient will achieve spinal flexion to 40 degrees        Start:  05/15/25    Expected End:  07/11/25            Patient will achieve spinal extension to neutral       Start:  05/15/25    Expected End:  07/11/25               Strength       Patient will demonstrate 3+/5 abdominal strength       Start:  05/15/25    Expected End:  07/11/25            Patient will demonstrate 4/5 spinal strength       Start:  05/15/25    Expected End:  07/11/25            Patient will achieve left hip flexion strength of 4/5       Start:  05/15/25    Expected End:  07/11/25            Patient will achieve left knee extension strength of 4/5       Start:  05/15/25    Expected End:  07/11/25                ELIEL MARTINEZ, PT, DPT        Clinical Information for Insurance Authorization     Dates of Services: 5/15/2025 to 7/11/2025  Discharge Plan: Patient will be discharged from skilled physical therapy treatment once all goals have been met, patient has plateaued, or physician/insurance requests discontinuation of care. Patient will be discharged with a home exercise program.   Type of therapy: Rehabilitative  ICD-10 Diagnosis Code(s): M54.16  Which side is symptomatic? Not applicable and/or symptoms are not  localized  Surgical: No  Surgical procedure: N/A  Surgery date: N/A.  Presenting symptoms/diagnoses are repetitive in nature.  Presenting symptoms are non-radiating in nature.   The rehabilitation is not related to a diagnosis of cancer.  The rehabilitation is not related to a diagnosis of lymphedema.  Patient's clinical presentation is:  Severe objective and functional deficits: consistent intense symptoms with severe loss of range of motion, strength, or ability to perform daily tasks  CPT Codes Requested:  42205 [therapeutic exercise], 02983 [neuromuscular re-education], 91415 [gait training], 79429 [manual therapy], 52388 [therapeutic activities], 66053 [aquatic therapy], 48816 [unattended electrical stimulation], and 80744 [mechanical traction]             Current Participants as of 5/15/2025    Name Type Comments Contact Info    NICK Jacome Referring Provider  [phone]    Signature pending            Sincerely,      ELIEL MARTINEZ, PT, DPT  [facility]                                                            Dear ELIEL MARTINEZ, PT, DPT,    RE: Ms. Halle Eric, MRN: [id number]    I certify that I have reviewed the attached plan of care and agree to the details within.        ___________________________  ___________________________  Provider Printed Name   Provider Signed Name      ___________________________  Date and Time

## 2025-05-22 ENCOUNTER — CLINICAL SUPPORT (OUTPATIENT)
Dept: REHABILITATION | Facility: HOSPITAL | Age: 55
End: 2025-05-22
Payer: COMMERCIAL

## 2025-05-22 DIAGNOSIS — M54.16 LUMBAR RADICULOPATHY: Primary | ICD-10-CM

## 2025-05-22 PROCEDURE — 97113 AQUATIC THERAPY/EXERCISES: CPT | Mod: CQ

## 2025-05-22 NOTE — PROGRESS NOTES
Outpatient Rehab    Physical Therapy Visit    Patient Name: Halle Eric  MRN: [id number]  YOB: 1970  Encounter Date: 5/22/2025    Therapy Diagnosis:   Encounter Diagnosis   Name Primary?    Lumbar radiculopathy Yes     Physician: Zoya Velásquez FNP    Physician Orders: Eval and Treat  Medical Diagnosis: Lumbar radiculopathy    Visit # / Visits Authorized:  1 / 12  Insurance Authorization Period: 5/15/2025 to 8/13/2025  Date of Evaluation: 5/15/2025  Plan of Care Certification: 5/15/2025 to 7/11/2025      PT/PTA: PTA   Number of PTA visits since last PT visit:1  Time In: 1000   Time Out: 1043  Total Time (in minutes): 43   Total Billable Time (in minutes): 43    FOTO:  Intake Score: 12%  Survey Score 2:  %  Survey Score 3:  %    Precautions:       Subjective   Pt reports 3/10 pain in both knees today and her back. She gets her first back injection on June 10th..  Pain reported as 3/10. Bilateral knees and back.    Objective            Treatment:  Therapeutic Exercise  TE 1: Fwd/Backward walking in pool x 4 laps  TE 2: Lateral walking in pool x 4 laps  TE 3: Hamstring and quad stretch on step 4 x 15 secod hold (B)  TE 4: Shoulder Horizontal Abduction with paddles open x 20  TE 5: Shoulder extension/retraction with paddles  TE 6: Shoulder flexion/abduction with paddles  TE 7: Seated bicycle 1 minute each way (forward and reverse)  TE 8: seated scissor kicks 3 x 30 seconds  TE 9: seated marches x 20 (B)  TE 10: seated LAQ x 20  Therapeutic Activity  TA 1: squats holding onto rail x 20  TA 2: standing hip flexion marches x 20 (B)  TA 3: standing hip abduction x 20 (B)  TA 4: toe raises x 30  TA 5: standing hamstring curls x 20 (B)    Time Entry(in minutes):  Aquatic Therapy Time Entry: 43    Assessment & Plan   Assessment: Pt arrived today for her initial visit after the evaluation with reports of pain in B knees and the lumbar area. Therapist had pt perform a series of LE and postural  strengthening exercises in the pool today. She tolerated this well and was able to perform exercises in the pool much better than what she is able to do on land. Overall, pt had no complaints of increased pain today. Will cont to progress as able when pt returns.  Evaluation/Treatment Tolerance: Patient tolerated treatment well    The patient will continue to benefit from skilled outpatient physical therapy in order to address the deficits listed in the problem list on the initial evaluation, provide patient and family education, and maximize the patients level of independence in the home and community environments.     The patient's spiritual, cultural, and educational needs were considered, and the patient is agreeable to the plan of care and goals.           Plan: Will cont with aquatic program as pt responds well to this.    Goals:   Active       Functional outcome       Patient stated goal: I just want my life back. I want to be able to walk and take care of myself.        Start:  05/15/25    Expected End:  07/11/25            Patient will demonstrate independence in home program for support of progression       Start:  05/15/25    Expected End:  06/13/25               Pain       Patient will report pain of 3/10 demonstrating a reduction of overall pain       Start:  05/15/25    Expected End:  07/11/25               Range of Motion       Patient will achieve spinal flexion to 40 degrees        Start:  05/15/25    Expected End:  07/11/25            Patient will achieve spinal extension to neutral       Start:  05/15/25    Expected End:  07/11/25               Strength       Patient will demonstrate 3+/5 abdominal strength       Start:  05/15/25    Expected End:  07/11/25            Patient will demonstrate 4/5 spinal strength       Start:  05/15/25    Expected End:  07/11/25            Patient will achieve left hip flexion strength of 4/5       Start:  05/15/25    Expected End:  07/11/25            Patient will  achieve left knee extension strength of 4/5       Start:  05/15/25    Expected End:  07/11/25                Melvin Santiago PTA

## 2025-05-27 ENCOUNTER — CLINICAL SUPPORT (OUTPATIENT)
Dept: REHABILITATION | Facility: HOSPITAL | Age: 55
End: 2025-05-27
Payer: COMMERCIAL

## 2025-05-27 DIAGNOSIS — M54.16 LUMBAR RADICULOPATHY: Primary | ICD-10-CM

## 2025-05-27 PROCEDURE — 97113 AQUATIC THERAPY/EXERCISES: CPT | Mod: CQ

## 2025-05-27 NOTE — PROGRESS NOTES
Outpatient Rehab    Physical Therapy Visit    Patient Name: Halle Eric  MRN: [id number]  YOB: 1970  Encounter Date: 5/27/2025    Therapy Diagnosis:   Encounter Diagnosis   Name Primary?    Lumbar radiculopathy Yes     Physician: Zoya Velásquez FNP    Physician Orders: Eval and Treat  Medical Diagnosis: Lumbar radiculopathy    Visit # / Visits Authorized:  2 / 12  Insurance Authorization Period: 5/15/2025 to 8/13/2025  Date of Evaluation: 5/15/2025  Plan of Care Certification: 5/15/2025 to 7/11/2025      PT/PTA: PTA   Number of PTA visits since last PT visit:2  Time In: 1047   Time Out: 1136  Total Time (in minutes): 49   Total Billable Time (in minutes): 49    FOTO:  Intake Score: 12%  Survey Score 2:  %  Survey Score 3:  %    Precautions:       Subjective   Pt reports 3/10 pain in both knees today and her back. She gets her first back injection on June 10th..  Pain reported as 3/10. Bilateral knees and back.    Objective            Treatment:  Therapeutic Exercise  TE 1: Fwd/Backward walking in pool x 4 laps  TE 2: Lateral walking in pool x 4 laps  TE 3: Hamstring and quad stretch on step 4 x 15 secod hold (B)  TE 4: Shoulder Horizontal Abduction with paddles open x 20  TE 5: Shoulder extension/retraction with paddles  TE 6: Shoulder flexion/abduction with paddles  TE 8: seated scissor kicks 3 x 30 seconds  TE 9: seated marches x 20 (B)  TE 10: seated LAQ x 20  Therapeutic Activity  TA 1: squats holding onto rail x 20  TA 2: standing hip flexion marches x 20 (B)  TA 3: standing hip abduction x 20 (B)  TA 4: toe raises x 30  TA 5: standing hamstring curls x 20 (B)  TA 6: forward step ups x 20 (B)      Time Entry(in minutes):  Aquatic Therapy Time Entry: 49    Assessment & Plan   Assessment: Pt arrived today for her second visit after the evaluation with reports of continued pain in B knees and the lumbar area. Therapist had pt perform a series of LE and postural strengthening exercises in the  pool again today. She tolerated this well and was able to perform exercises in the pool much better than what she is able to do on land. Added forward step ups activity bilaterally today. Overall, pt had no complaints of increased pain today. Will cont to progress as able when pt returns.  Evaluation/Treatment Tolerance: Patient tolerated treatment well    The patient will continue to benefit from skilled outpatient physical therapy in order to address the deficits listed in the problem list on the initial evaluation, provide patient and family education, and maximize the patients level of independence in the home and community environments.     The patient's spiritual, cultural, and educational needs were considered, and the patient is agreeable to the plan of care and goals.           Plan: Will cont with aquatic program as pt responds well to this.    Goals:   Active       Functional outcome       Patient stated goal: I just want my life back. I want to be able to walk and take care of myself.        Start:  05/15/25    Expected End:  07/11/25            Patient will demonstrate independence in home program for support of progression       Start:  05/15/25    Expected End:  06/13/25               Pain       Patient will report pain of 3/10 demonstrating a reduction of overall pain       Start:  05/15/25    Expected End:  07/11/25               Range of Motion       Patient will achieve spinal flexion to 40 degrees        Start:  05/15/25    Expected End:  07/11/25            Patient will achieve spinal extension to neutral       Start:  05/15/25    Expected End:  07/11/25               Strength       Patient will demonstrate 3+/5 abdominal strength       Start:  05/15/25    Expected End:  07/11/25            Patient will demonstrate 4/5 spinal strength       Start:  05/15/25    Expected End:  07/11/25            Patient will achieve left hip flexion strength of 4/5       Start:  05/15/25    Expected End:   07/11/25            Patient will achieve left knee extension strength of 4/5       Start:  05/15/25    Expected End:  07/11/25                Melvin Santiago PTA

## 2025-05-29 ENCOUNTER — CLINICAL SUPPORT (OUTPATIENT)
Dept: REHABILITATION | Facility: HOSPITAL | Age: 55
End: 2025-05-29
Payer: COMMERCIAL

## 2025-05-29 DIAGNOSIS — M54.16 LUMBAR RADICULOPATHY: Primary | ICD-10-CM

## 2025-05-29 PROCEDURE — 97113 AQUATIC THERAPY/EXERCISES: CPT | Mod: CQ

## 2025-05-29 NOTE — PROGRESS NOTES
Outpatient Rehab    Physical Therapy Visit    Patient Name: Halle Eric  MRN: [id number]  YOB: 1970  Encounter Date: 5/29/2025    Therapy Diagnosis:   Encounter Diagnosis   Name Primary?    Lumbar radiculopathy Yes     Physician: Zoya Velásquez FNP    Physician Orders: Eval and Treat  Medical Diagnosis: Lumbar radiculopathy    Visit # / Visits Authorized:  3 / 12  Insurance Authorization Period: 5/15/2025 to 8/13/2025  Date of Evaluation: 5/15/2025  Plan of Care Certification: 5/15/2025 to 7/11/2025      PT/PTA: PTA   Number of PTA visits since last PT visit:3  Time In: 0959   Time Out: 1043  Total Time (in minutes): 44   Total Billable Time (in minutes): 44    FOTO:  Intake Score: 12%  Survey Score 2:  %  Survey Score 3:  %    Precautions:       Subjective   Pt reports 5/10 pain in both knees today and her back. She reports the knees are popping and clicking this morning..  Pain reported as 5/10. Bilateral knees and back.    Objective            Treatment:   Therapeutic Exercise  TE 1: Fwd/Backward walking in pool x 4 laps  TE 2: Lateral walking in pool x 4 laps  TE 3: Hamstring and quad stretch on step 4 x 15 secod hold (B)  TE 4: Shoulder Horizontal Abduction with paddles open x 20  TE 5: Shoulder flexion/extension with paddles open x 20  TE 6: Shoulder abduction/adduction with paddles open x 20  TE 7: Seated bicycle 1 minute each way (forward and reverse)  TE 8: seated scissor kicks 3 x 30 seconds  TE 9: seated marches x 20 (B)  TE 10: seated LAQ x 20  Therapeutic Activity  TA 1: squats holding onto rail x 20  TA 2: standing hip flexion marches x 20 (B)  TA 3: standing hip abduction x 20 (B)  TA 4: toe raises x 30  TA 5: standing hamstring curls x 20 (B)  TA 6: forward step ups x 20 (B)  TA 7: standing hip extension at rail x 20 (B)    Time Entry(in minutes):  Aquatic Therapy Time Entry: 44    Assessment & Plan   Assessment: Pt arrived today for her third visit after the evaluation with  reports of continued pain in B knees and the lumbar area. Therapist had pt perform a series of LE and postural strengthening exercises in the pool again today. She tolerated this well and was able to perform exercises in the pool much better than what she is able to do on land. Added standing hip extension today while holding on to rail. Overall, pt had no complaints of increased pain today. Will cont to progress as able when pt returns.  Evaluation/Treatment Tolerance: Patient tolerated treatment well    The patient will continue to benefit from skilled outpatient physical therapy in order to address the deficits listed in the problem list on the initial evaluation, provide patient and family education, and maximize the patients level of independence in the home and community environments.     The patient's spiritual, cultural, and educational needs were considered, and the patient is agreeable to the plan of care and goals.           Plan: Will cont with aquatic program as pt responds well to this.    Goals:   Active       Functional outcome       Patient stated goal: I just want my life back. I want to be able to walk and take care of myself.        Start:  05/15/25    Expected End:  07/11/25            Patient will demonstrate independence in home program for support of progression       Start:  05/15/25    Expected End:  06/13/25               Pain       Patient will report pain of 3/10 demonstrating a reduction of overall pain       Start:  05/15/25    Expected End:  07/11/25               Range of Motion       Patient will achieve spinal flexion to 40 degrees        Start:  05/15/25    Expected End:  07/11/25            Patient will achieve spinal extension to neutral       Start:  05/15/25    Expected End:  07/11/25               Strength       Patient will demonstrate 3+/5 abdominal strength       Start:  05/15/25    Expected End:  07/11/25            Patient will demonstrate 4/5 spinal strength        Start:  05/15/25    Expected End:  07/11/25            Patient will achieve left hip flexion strength of 4/5       Start:  05/15/25    Expected End:  07/11/25            Patient will achieve left knee extension strength of 4/5       Start:  05/15/25    Expected End:  07/11/25                Melvin Santiago PTA

## 2025-06-03 ENCOUNTER — CLINICAL SUPPORT (OUTPATIENT)
Dept: REHABILITATION | Facility: HOSPITAL | Age: 55
End: 2025-06-03
Payer: COMMERCIAL

## 2025-06-03 DIAGNOSIS — M54.16 LUMBAR RADICULOPATHY: Primary | ICD-10-CM

## 2025-06-03 PROCEDURE — 97113 AQUATIC THERAPY/EXERCISES: CPT

## 2025-06-05 ENCOUNTER — CLINICAL SUPPORT (OUTPATIENT)
Dept: REHABILITATION | Facility: HOSPITAL | Age: 55
End: 2025-06-05
Payer: COMMERCIAL

## 2025-06-05 DIAGNOSIS — M54.16 LUMBAR RADICULOPATHY: Primary | ICD-10-CM

## 2025-06-05 PROCEDURE — 97113 AQUATIC THERAPY/EXERCISES: CPT | Mod: CQ

## 2025-06-09 ENCOUNTER — CLINICAL SUPPORT (OUTPATIENT)
Dept: REHABILITATION | Facility: HOSPITAL | Age: 55
End: 2025-06-09
Payer: COMMERCIAL

## 2025-06-09 DIAGNOSIS — M54.16 LUMBAR RADICULOPATHY: Primary | ICD-10-CM

## 2025-06-09 PROCEDURE — 97113 AQUATIC THERAPY/EXERCISES: CPT

## 2025-06-09 NOTE — PROGRESS NOTES
Outpatient Rehab    Physical Therapy Progress Note    Patient Name: Halle Eric  MRN: [id number]  YOB: 1970  Encounter Date: 6/9/2025    Therapy Diagnosis:   Encounter Diagnosis   Name Primary?    Lumbar radiculopathy Yes     Physician: Zoya Velásquez FNP    Physician Orders: Eval and Treat  Medical Diagnosis: Lumbar radiculopathy  Surgical Diagnosis: Not applicable for this Episode   Surgical Date: Not applicable for this Episode    Visit # / Visits Authorized:  6 / 12  Insurance Authorization Period: 5/15/2025 to 8/13/2025  Date of Evaluation: 5/15/2025  Plan of Care Certification: 5/15/2025 to 7/11/2025      PT/PTA: PT   Number of PTA visits since last PT visit:0  Time In: 1302   Time Out: 1347  Total Time (in minutes): 45   Total Billable Time (in minutes): 45    FOTO:  Intake Score:  %  Survey Score 2:  %  Survey Score 3:  %    Precautions:       Subjective   Reports no pain today.  Pain reported as 0/10. Bilateral knees and back.    Objective            Treatment:  Therapeutic Exercise  TE 1: Fwd/Backward walking in pool x 4 laps  TE 2: Lateral walking in pool x 4 laps  TE 3: Hamstring and quad stretch on step 4 x 15 second hold (B)  TE 4: Shoulder Horizontal Abduction with paddles closed x 20  TE 5: Shoulder flexion/extension with paddles closed x 20  TE 6: Shoulder abduction/adduction with paddles open x 20  TE 7: Seated bicycle 1 minute each way (forward and reverse)  TE 8: seated scissor kicks 3 x 30 seconds  TE 9: seated marches x 30 (B)  TE 10: seated LAQ x 30  Therapeutic Activity  TA 1: squats holding onto rail x 20  TA 2: standing hip flexion marches x 20 (B)  TA 3: standing hip abduction x 20 (B)  TA 4: toe raises x 30  TA 5: standing hamstring curls x 20 (B)  TA 6: forward step ups x 20 (B)  TA 7: standing hip extension at rail x 20 (B)    Time Entry(in minutes):  Aquatic Therapy Time Entry: 45    Assessment & Plan   Assessment: Patient is responding well to therapy. She  reports no pain today in the back or knees. She moves so much better in the pool and is getting a good benefit from the Aquatic Therapy. She is showing good improvement with strength and overall mobility. She was able to perform all of the exercises today with fewer rest breaks and was able to perform 3 sets of 10 rather than 2 sets of 10. Plan to continue to progress her as tolerated.  Evaluation/Treatment Tolerance: Patient tolerated treatment well    The patient will continue to benefit from skilled outpatient physical therapy in order to address the deficits listed in the problem list on the initial evaluation, provide patient and family education, and maximize the patients level of independence in the home and community environments.     The patient's spiritual, cultural, and educational needs were considered, and the patient is agreeable to the plan of care and goals.           Plan: Will cont with aquatic program as pt responds well to this. Sup Visit performed today with YUSRA Terry, YUSRA Morris, and YUSRA Davidson. All goals and treatment plan reviewed. Will work toward completion of all goals set.    Goals:   Active       Functional outcome       Patient stated goal: I just want my life back. I want to be able to walk and take care of myself.  (Progressing)       Start:  05/15/25    Expected End:  07/11/25            Patient will demonstrate independence in home program for support of progression (Progressing)       Start:  05/15/25    Expected End:  06/13/25               Pain       Patient will report pain of 3/10 demonstrating a reduction of overall pain (Progressing)       Start:  05/15/25    Expected End:  07/11/25               Range of Motion       Patient will achieve spinal flexion to 40 degrees  (Progressing)       Start:  05/15/25    Expected End:  07/11/25            Patient will achieve spinal extension to neutral (Progressing)       Start:  05/15/25    Expected End:   07/11/25               Strength       Patient will demonstrate 3+/5 abdominal strength (Progressing)       Start:  05/15/25    Expected End:  07/11/25            Patient will demonstrate 4/5 spinal strength (Progressing)       Start:  05/15/25    Expected End:  07/11/25            Patient will achieve left hip flexion strength of 4/5 (Progressing)       Start:  05/15/25    Expected End:  07/11/25            Patient will achieve left knee extension strength of 4/5 (Progressing)       Start:  05/15/25    Expected End:  07/11/25                ELIEL MARTINEZ, PT, DPT

## 2025-06-11 ENCOUNTER — CLINICAL SUPPORT (OUTPATIENT)
Dept: REHABILITATION | Facility: HOSPITAL | Age: 55
End: 2025-06-11
Payer: COMMERCIAL

## 2025-06-11 DIAGNOSIS — M54.16 LUMBAR RADICULOPATHY: Primary | ICD-10-CM

## 2025-06-11 PROCEDURE — 97113 AQUATIC THERAPY/EXERCISES: CPT | Mod: CQ

## 2025-06-11 NOTE — PROGRESS NOTES
Outpatient Rehab    Physical Therapy Progress Note    Patient Name: Halle Eric  MRN: [id number]  YOB: 1970  Encounter Date: 6/11/2025    Therapy Diagnosis:   Encounter Diagnosis   Name Primary?    Lumbar radiculopathy Yes     Physician: Zoya Velásquez FNP    Physician Orders: Eval and Treat  Medical Diagnosis: Lumbar radiculopathy  Surgical Diagnosis: Not applicable for this Episode   Surgical Date: Not applicable for this Episode    Visit # / Visits Authorized:  7 / 12  Insurance Authorization Period: 5/15/2025 to 8/13/2025  Date of Evaluation: 5/15/2025  Plan of Care Certification: 5/15/2025 to 7/11/2025      PT/PTA: PTA   Number of PTA visits since last PT visit:1  Time In: 0916   Time Out: 1001  Total Time (in minutes): 45   Total Billable Time (in minutes): 45    FOTO:  Intake Score: 12%  Survey Score 2: 28%  Survey Score 3:  %    Precautions:       Subjective   Pt reports the she got an injection yesterday and she feels like her back is already feeling better. Still having some trouble with the knees..  Pain reported as 0/10. Bilateral knees and back.    Objective            Treatment:  Therapeutic Exercise  TE 1: Fwd/Backward walking in pool x 4 laps  TE 2: Lateral walking in pool x 4 laps  TE 3: Hamstring and quad stretch on step 4 x 15 second hold (B)  TE 4: Shoulder Horizontal Abduction with paddles closed x 20  TE 7: Seated bicycle 1 minute each way (forward and reverse)  TE 8: seated scissor kicks 3 x 30 seconds  TE 9: seated marches x 30 (B)  TE 10: seated LAQ x 30  Therapeutic Activity  TA 1: squats holding onto rail x 30  TA 2: standing hip flexion marches x 30 (B)  TA 3: standing hip abduction x 30 (B)  TA 4: toe raises x 30  TA 5: standing hamstring curls x 30 (B)  TA 6: forward step ups x 30 (B)  TA 7: standing hip extension at rail x 30 (B)    Time Entry(in minutes):  Aquatic Therapy Time Entry: 45    Assessment & Plan   Assessment: Patient reports the she got an injection  yesterday and she feels like her back is already feeling better. Still having some trouble with the knees. She is responding well to therapy.  She moves so much better in the pool and is getting a good benefit from the Aquatic Therapy. She is showing good improvement with strength and overall mobility. She was able to perform all of the exercises today with fewer rest breaks. Plan to continue to progress her as tolerated.  Evaluation/Treatment Tolerance: Patient tolerated treatment well    The patient will continue to benefit from skilled outpatient physical therapy in order to address the deficits listed in the problem list on the initial evaluation, provide patient and family education, and maximize the patients level of independence in the home and community environments.     The patient's spiritual, cultural, and educational needs were considered, and the patient is agreeable to the plan of care and goals.           Plan: Will cont with aquatic program as pt responds well to this.    Goals:   Active       Functional outcome       Patient stated goal: I just want my life back. I want to be able to walk and take care of myself.  (Progressing)       Start:  05/15/25    Expected End:  07/11/25            Patient will demonstrate independence in home program for support of progression (Progressing)       Start:  05/15/25    Expected End:  06/13/25               Pain       Patient will report pain of 3/10 demonstrating a reduction of overall pain (Progressing)       Start:  05/15/25    Expected End:  07/11/25               Range of Motion       Patient will achieve spinal flexion to 40 degrees  (Progressing)       Start:  05/15/25    Expected End:  07/11/25            Patient will achieve spinal extension to neutral (Progressing)       Start:  05/15/25    Expected End:  07/11/25               Strength       Patient will demonstrate 3+/5 abdominal strength (Progressing)       Start:  05/15/25    Expected End:   07/11/25            Patient will demonstrate 4/5 spinal strength (Progressing)       Start:  05/15/25    Expected End:  07/11/25            Patient will achieve left hip flexion strength of 4/5 (Progressing)       Start:  05/15/25    Expected End:  07/11/25            Patient will achieve left knee extension strength of 4/5 (Progressing)       Start:  05/15/25    Expected End:  07/11/25                Melvin Santiago PTA

## 2025-06-18 ENCOUNTER — CLINICAL SUPPORT (OUTPATIENT)
Dept: REHABILITATION | Facility: HOSPITAL | Age: 55
End: 2025-06-18
Payer: COMMERCIAL

## 2025-06-18 DIAGNOSIS — M54.16 LUMBAR RADICULOPATHY: Primary | ICD-10-CM

## 2025-06-18 PROCEDURE — 97113 AQUATIC THERAPY/EXERCISES: CPT

## 2025-06-18 NOTE — PROGRESS NOTES
Outpatient Rehab    Physical Therapy Progress Note    Patient Name: Halle Eric  MRN: [id number]  YOB: 1970  Encounter Date: 6/18/2025    Therapy Diagnosis:   Encounter Diagnosis   Name Primary?    Lumbar radiculopathy Yes     Physician: Zoya Velásquez FNP    Physician Orders: Eval and Treat  Medical Diagnosis: Lumbar radiculopathy  Surgical Diagnosis: Not applicable for this Episode   Surgical Date: Not applicable for this Episode  Days Since Last Surgery: Not applicable for this Episode    Visit # / Visits Authorized:  8 / 12  Insurance Authorization Period: 5/15/2025 to 8/13/2025  Date of Evaluation: 5/15/2025  Plan of Care Certification: 5/15/2025 to 7/11/2025      PT/PTA: PT   Number of PTA visits since last PT visit:0  Time In: 0959   Time Out: 1044  Total Time (in minutes): 45   Total Billable Time (in minutes): 45    FOTO:  Intake Score:  %  Survey Score 2:  %  Survey Score 3:  %    Precautions:       Subjective   She reports the back is a little better following the injection but her knees are hurting really bad. She is considering a TKA for the Left..  Pain reported as 4/10. Bilateral knees 4/10 and back 1-2/10    Objective            Treatment: Aquatic Therapy   Therapeutic Exercise  TE 1: Fwd/Backward walking in pool x 4 laps  TE 2: Lateral walking in pool x 4 laps  TE 3: Hamstring and quad stretch on step 4 x 15 second hold (B)  TE 4: Shoulder Horizontal Abduction with paddles closed x 20  TE 5: Shoulder flexion/extension with paddles closed x 20  TE 6: Shoulder abduction/adduction with paddles open x 20  TE 7: Seated bicycle 1 minute each way (forward and reverse)  TE 8: seated scissor kicks x 1 minute  TE 9: seated marches x 30 (B)  TE 10: seated LAQ x 30  Therapeutic Activity  TA 1: squats holding onto rail x 30  TA 2: standing hip flexion marches x 30 (B)  TA 3: standing hip abduction x 30 (B)  TA 4: toe raises x 30  TA 5: standing hamstring curls x 30 (B)  TA 6: forward step  ups x 30 (B)  TA 7: standing hip extension at rail x 30 (B)    Time Entry(in minutes):  Aquatic Therapy Time Entry: 45    Assessment & Plan   Assessment: Patient reports she had an injection in her back last week. This injection has helped with the back pain. She reports she is able to stand longer now without her back hurting so bad. Unfortunately, her knees continue to hurt. She reports the Left Knee is worse than the Right. She is considering following up with an Orthopedic Surgeon regarding the knees as she is thinking about having a TKA on the Left. Her mobility overall has improved now that she is in therapy and she reports she is able to do more housework but she has to stop frequently to rest due to pain. We plan to see her for approximately one more week and then we will refer her back to the Ortho MD for further assessment.   Evaluation/Treatment Tolerance: Patient tolerated treatment well    The patient will continue to benefit from skilled outpatient physical therapy in order to address the deficits listed in the problem list on the initial evaluation, provide patient and family education, and maximize the patients level of independence in the home and community environments.     The patient's spiritual, cultural, and educational needs were considered, and the patient is agreeable to the plan of care and goals.           Plan: Will cont with aquatic program as pt responds well to this. Sup Visit performed today with YUSRA Terry, YUSRA Morris, and YUSRA Davidson. All goals and treatment plan reviewed. Will work toward completion of all goals set.    Goals:   Active       Functional outcome       Patient stated goal: I just want my life back. I want to be able to walk and take care of myself.  (Progressing)       Start:  05/15/25    Expected End:  07/11/25            Patient will demonstrate independence in home program for support of progression (Met)       Start:  05/15/25    Expected  End:  06/13/25    Resolved:  06/18/25            Pain       Patient will report pain of 3/10 demonstrating a reduction of overall pain (Progressing)       Start:  05/15/25    Expected End:  07/11/25               Strength       Patient will demonstrate 3+/5 abdominal strength (Progressing)       Start:  05/15/25    Expected End:  07/11/25            Patient will demonstrate 4/5 spinal strength (Progressing)       Start:  05/15/25    Expected End:  07/11/25            Patient will achieve left hip flexion strength of 4/5 (Met)       Start:  05/15/25    Expected End:  07/11/25    Resolved:  06/18/25         Patient will achieve left knee extension strength of 4/5 (Met)       Start:  05/15/25    Expected End:  07/11/25    Resolved:  06/18/25           Resolved       Range of Motion       Patient will achieve spinal flexion to 40 degrees  (Met)       Start:  05/15/25    Expected End:  07/11/25    Resolved:  06/18/25         Patient will achieve spinal extension to neutral (Met)       Start:  05/15/25    Expected End:  07/11/25    Resolved:  06/18/25             ELIEL MARTINEZ, PT, DPT

## 2025-06-20 ENCOUNTER — CLINICAL SUPPORT (OUTPATIENT)
Dept: REHABILITATION | Facility: HOSPITAL | Age: 55
End: 2025-06-20
Payer: COMMERCIAL

## 2025-06-20 DIAGNOSIS — M54.16 LUMBAR RADICULOPATHY: Primary | ICD-10-CM

## 2025-06-20 PROCEDURE — 97113 AQUATIC THERAPY/EXERCISES: CPT | Mod: CQ

## 2025-06-20 NOTE — PROGRESS NOTES
Outpatient Rehab    Physical Therapy Visit    Patient Name: Halle Eric  MRN: [id number]  YOB: 1970  Encounter Date: 6/20/2025    Therapy Diagnosis:   Encounter Diagnosis   Name Primary?    Lumbar radiculopathy Yes     Physician: Zoya Velásquez FNP    Physician Orders: Eval and Treat  Medical Diagnosis: Lumbar radiculopathy  Surgical Diagnosis: Not applicable for this Episode   Surgical Date: Not applicable for this Episode  Days Since Last Surgery: Not applicable for this Episode    Visit # / Visits Authorized:  9 / 12  Insurance Authorization Period: 5/15/2025 to 8/13/2025  Date of Evaluation: 5/15/2025  Plan of Care Certification: 5/15/2025 to 7/11/2025      PT/PTA: PTA   Number of PTA visits since last PT visit:1  Time In: 0947   Time Out: 1015  Total Time (in minutes): 28   Total Billable Time (in minutes): 28    FOTO:  Intake Score: 12%  Survey Score 2: 28%  Survey Score 3:  %    Precautions:       Subjective   I am running late this morning..  Pain reported as 4/10. Bilateral knees 4/10 and back 1-2/10    Objective            Treatment:  Therapeutic Exercise  TE 1: Fwd/Backward walking in pool x 4 laps  TE 2: Lateral walking in pool x 4 laps  TE 3: Hamstring and quad stretch on step 4 x 15 second hold (B)  TE 4: Shoulder Horizontal Abduction with paddles closed x 20  TE 6: Shoulder abduction/adduction with paddles closed x 20  Therapeutic Activity  TA 1: squats holding onto rail x 20  TA 2: standing hip flexion marches x 20 (B)  TA 3: standing hip abduction x 20 (B)  TA 6: forward step ups x 20 (B)  TA 7: standing hip extension at rail x 20 (B)    Time Entry(in minutes):  Aquatic Therapy Time Entry: 28    Assessment & Plan   Assessment: Patient arrived 17 minutes late to appt today, so time was shortened to accommodate for this. Patient reports her knees continue to hurt. She reports the Left Knee is worse than the Right. She is considering following up with an Orthopedic Surgeon  regarding the knees as she is thinking about having a TKA on the Left. Her mobility overall has improved now that she is in therapy and she reports she is able to do more housework but she has to stop frequently to rest due to pain. We plan to see her for approximately one more week and then we will refer her back to the Ortho MD for further assessment.   Evaluation/Treatment Tolerance: Patient tolerated treatment well    The patient will continue to benefit from skilled outpatient physical therapy in order to address the deficits listed in the problem list on the initial evaluation, provide patient and family education, and maximize the patients level of independence in the home and community environments.     The patient's spiritual, cultural, and educational needs were considered, and the patient is agreeable to the plan of care and goals.           Plan: Will cont with aquatic program as pt responds well to this    Goals:   Active       Functional outcome       Patient stated goal: I just want my life back. I want to be able to walk and take care of myself.  (Progressing)       Start:  05/15/25    Expected End:  07/11/25            Patient will demonstrate independence in home program for support of progression (Met)       Start:  05/15/25    Expected End:  06/13/25    Resolved:  06/18/25            Pain       Patient will report pain of 3/10 demonstrating a reduction of overall pain (Progressing)       Start:  05/15/25    Expected End:  07/11/25               Strength       Patient will demonstrate 3+/5 abdominal strength (Progressing)       Start:  05/15/25    Expected End:  07/11/25            Patient will demonstrate 4/5 spinal strength (Progressing)       Start:  05/15/25    Expected End:  07/11/25            Patient will achieve left hip flexion strength of 4/5 (Met)       Start:  05/15/25    Expected End:  07/11/25    Resolved:  06/18/25         Patient will achieve left knee extension strength of 4/5  (Met)       Start:  05/15/25    Expected End:  07/11/25    Resolved:  06/18/25           Resolved       Range of Motion       Patient will achieve spinal flexion to 40 degrees  (Met)       Start:  05/15/25    Expected End:  07/11/25    Resolved:  06/18/25         Patient will achieve spinal extension to neutral (Met)       Start:  05/15/25    Expected End:  07/11/25    Resolved:  06/18/25             Melvin Santiago PTA

## 2025-06-24 ENCOUNTER — CLINICAL SUPPORT (OUTPATIENT)
Dept: REHABILITATION | Facility: HOSPITAL | Age: 55
End: 2025-06-24
Payer: COMMERCIAL

## 2025-06-24 DIAGNOSIS — M54.16 LUMBAR RADICULOPATHY: Primary | ICD-10-CM

## 2025-06-24 PROCEDURE — 97113 AQUATIC THERAPY/EXERCISES: CPT

## 2025-06-24 NOTE — PROGRESS NOTES
Outpatient Rehab    Physical Therapy Progress Note    Patient Name: Halle Eric  MRN: [id number]  YOB: 1970  Encounter Date: 6/24/2025    Therapy Diagnosis:   Encounter Diagnosis   Name Primary?    Lumbar radiculopathy Yes     Physician: Zoya Velásquez FNP    Physician Orders: Eval and Treat  Medical Diagnosis: Lumbar radiculopathy  Surgical Diagnosis: Not applicable for this Episode   Surgical Date: Not applicable for this Episode  Days Since Last Surgery: Not applicable for this Episode    Visit # / Visits Authorized:  10 / 12  Insurance Authorization Period: 5/15/2025 to 8/13/2025  Date of Evaluation: 5/15/2025  Plan of Care Certification: 5/15/2025 to 7/11/2025      PT/PTA: PT   Number of PTA visits since last PT visit:0  Time In: 1003   Time Out: 1045  Total Time (in minutes): 42   Total Billable Time (in minutes): 42    FOTO:  Intake Score:  %  Survey Score 2:  %  Survey Score 3:  %    Precautions:       Subjective   My Back is feeling much better but my knees still hurt. Left Knee hurts worse than the Right..  Pain reported as 4/10. Bilateral knees 4/10 and back 1-2/10    Objective            Treatment:  Therapeutic Exercise  TE 1: Fwd/Backward walking in pool x 4 laps  TE 2: Lateral walking in pool x 4 laps  TE 3: Hamstring and quad stretch on step 4 x 15 second hold (B)  TE 4: Shoulder Horizontal Abduction with paddles closed x 20  TE 5: Shoulder flexion/extension with paddles closed x 20  TE 6: Shoulder abduction/adduction with paddles closed x 20  TE 7: Seated bicycle 1 minute each way (forward and reverse)  TE 8: seated scissor kicks x 1 minute  TE 9: seated marches x 30 (B)  TE 10: seated LAQ x 30  Therapeutic Activity  TA 1: squats holding onto rail x 20  TA 2: standing hip flexion marches x 20 (B)  TA 3: standing hip abduction x 20 (B)  TA 4: toe raises x 30  TA 5: standing hamstring curls x 30 (B)  TA 6: forward step ups x 20 (B)  TA 7: standing hip extension at rail x 20  (B)    Time Entry(in minutes):  Aquatic Therapy Time Entry: 42    Assessment & Plan   Assessment: Patient is doing very well with Physical Therapy. She tolerates the exercises in the pool whereas she would not be able to tolerate the same exercises on land. Her back pain is much improved and she only really has back pain now with very prolonged standing and walking. Unfortunately she is still having bilateral knee pain with left knee worse than right. Her plans at this time are to make an appointment with Dr Amaury Headley to discuss possible surgical options for that left knee. She also plans to join the gym at [facility] [place] to have access to the pool there so she can continue with the Aquatic program. Her insurance is only allowing one more visit with Physical Therapy so we will see her one more time and then discharge her from Physical Therapy due to insurance restrictions.   Evaluation/Treatment Tolerance: Patient tolerated treatment well    The patient will continue to benefit from skilled outpatient physical therapy in order to address the deficits listed in the problem list on the initial evaluation, provide patient and family education, and maximize the patients level of independence in the home and community environments.     The patient's spiritual, cultural, and educational needs were considered, and the patient is agreeable to the plan of care and goals.           Plan: Her insurance is only allowing one more visit with Physical Therapy so we will see her one more time and then discharge her from Physical Therapy due to insurance restrictions.    Goals:   Active       Functional outcome       Patient stated goal: I just want my life back. I want to be able to walk and take care of myself.  (Progressing)       Start:  05/15/25    Expected End:  07/11/25            Patient will demonstrate independence in home program for support of progression (Met)       Start:  05/15/25    Expected End:  06/13/25    Resolved:   06/18/25            Pain       Patient will report pain of 3/10 demonstrating a reduction of overall pain (Progressing)       Start:  05/15/25    Expected End:  07/11/25               Strength       Patient will demonstrate 3+/5 abdominal strength (Met)       Start:  05/15/25    Expected End:  07/11/25    Resolved:  06/24/25         Patient will demonstrate 4/5 spinal strength (Met)       Start:  05/15/25    Expected End:  07/11/25    Resolved:  06/24/25         Patient will achieve left hip flexion strength of 4/5 (Met)       Start:  05/15/25    Expected End:  07/11/25    Resolved:  06/18/25         Patient will achieve left knee extension strength of 4/5 (Met)       Start:  05/15/25    Expected End:  07/11/25    Resolved:  06/18/25           Resolved       Range of Motion       Patient will achieve spinal flexion to 40 degrees  (Met)       Start:  05/15/25    Expected End:  07/11/25    Resolved:  06/18/25         Patient will achieve spinal extension to neutral (Met)       Start:  05/15/25    Expected End:  07/11/25    Resolved:  06/18/25             ELIEL MARTINEZ, PT, DPT

## 2025-06-26 ENCOUNTER — CLINICAL SUPPORT (OUTPATIENT)
Dept: REHABILITATION | Facility: HOSPITAL | Age: 55
End: 2025-06-26
Payer: COMMERCIAL

## 2025-06-26 DIAGNOSIS — M54.16 LUMBAR RADICULOPATHY: Primary | ICD-10-CM

## 2025-06-26 PROCEDURE — 97113 AQUATIC THERAPY/EXERCISES: CPT

## 2025-06-26 NOTE — PROGRESS NOTES
Outpatient Rehab    Physical Therapy Discharge    Patient Name: Halle Eric  MRN: [id number]  YOB: 1970  Encounter Date: 6/26/2025    Therapy Diagnosis:   Encounter Diagnosis   Name Primary?    Lumbar radiculopathy Yes     Physician: Zoya Velásquez FNP    Physician Orders: Eval and Treat  Medical Diagnosis: Lumbar radiculopathy  Surgical Diagnosis: Not applicable for this Episode   Surgical Date: Not applicable for this Episode  Days Since Last Surgery: Not applicable for this Episode    Visit # / Visits Authorized:  11 / 12  Insurance Authorization Period: 5/15/2025 to 8/13/2025  Date of Evaluation: 5/15/2025  Plan of Care Certification: 5/15/2025 to 7/11/2025      PT/PTA: PT   Number of PTA visits since last PT visit:0  Time In: 1050   Time Out: 1130  Total Time (in minutes): 40   Total Billable Time (in minutes): 40    FOTO:  Intake Score: 12%  Survey Score 2: 28%  Survey Score 3: 35%    Precautions:       Subjective   Knees are still hurting, but back is feeling better..  Pain reported as 4/10. Bilateral knees 4/10 and back 1-2/10    Objective            Treatment:  Therapeutic Exercise  TE 1: Fwd/Backward walking in pool x 4 laps  TE 2: Lateral walking in pool x 4 laps  TE 3: Hamstring and quad stretch on step 4 x 15 second hold (B)  TE 4: Shoulder Horizontal Abduction with paddles closed x 20  TE 5: Shoulder flexion/extension with paddles closed x 20  TE 6: Shoulder abduction/adduction with paddles closed x 20  TE 7: Seated bicycle 1 minute each way (forward and reverse)  TE 8: seated scissor kicks x 1 minute  TE 9: seated marches x 30 (B)  TE 10: seated LAQ x 30  Therapeutic Activity  TA 1: squats holding onto rail x 20  TA 2: standing hip flexion marches x 20 (B)  TA 3: standing hip abduction x 20 (B)  TA 4: toe raises x 30  TA 5: standing hamstring curls x 30 (B)  TA 6: forward step ups x 20 (B)  TA 7: standing hip extension at rail x 20 (B)    Time Entry(in minutes):  Aquatic Therapy  Time Entry: 40    Assessment & Plan   Assessment: Patient has done very well with Physical Therapy. She tolerated the exercises in the pool whereas she would not be able to tolerate the same exercises on land. Her back pain is much improved and she only really has back pain now with very prolonged standing and walking. Unfortunately she is still having bilateral knee pain with left knee worse than right. Her plans at this time are to make an appointment with Dr Amaury Headley to discuss possible surgical options for that left knee. She also plans to join the gym at [facility] to have access to the pool there so she can continue with the Aquatic program. Her insurance only allowed Evaluation plus 12 visits and today is the last allowed visit. Therefore Therapist is Discharging her from Physical Therapy due to insurance restrictions. Therapist did establish a Home Exercise Program and patient will continue with this.   Evaluation/Treatment Tolerance: Patient tolerated treatment well    The patient's spiritual, cultural, and educational needs were considered, and the patient is agreeable to the plan of care and goals.           Plan: Patient is discharged from Physical Therapy due to insurance restrictions.    Goals:   Active       Pain       Patient will report pain of 3/10 demonstrating a reduction of overall pain (Unable to Meet)       Start:  05/15/25    Expected End:  07/11/25               Strength       Patient will demonstrate 3+/5 abdominal strength (Met)       Start:  05/15/25    Expected End:  07/11/25    Resolved:  06/24/25         Patient will demonstrate 4/5 spinal strength (Met)       Start:  05/15/25    Expected End:  07/11/25    Resolved:  06/24/25         Patient will achieve left hip flexion strength of 4/5 (Met)       Start:  05/15/25    Expected End:  07/11/25    Resolved:  06/18/25         Patient will achieve left knee extension strength of 4/5 (Met)       Start:  05/15/25    Expected End:  07/11/25     Resolved:  06/18/25           Resolved       Functional outcome       Patient stated goal: I just want my life back. I want to be able to walk and take care of myself.  (Met)       Start:  05/15/25    Expected End:  07/11/25    Resolved:  06/26/25         Patient will demonstrate independence in home program for support of progression (Met)       Start:  05/15/25    Expected End:  06/13/25    Resolved:  06/18/25            Range of Motion       Patient will achieve spinal flexion to 40 degrees  (Met)       Start:  05/15/25    Expected End:  07/11/25    Resolved:  06/18/25         Patient will achieve spinal extension to neutral (Met)       Start:  05/15/25    Expected End:  07/11/25    Resolved:  06/18/25             ELIEL MARTINEZ, PT, DPT

## 2025-06-26 NOTE — PATIENT INSTRUCTIONS
Aquatic Therapy    Standing Leg Exercises :   1: Fwd/Backward walking in pool x 4 laps  2: Lateral (Side to Side) walking in pool x 4 laps  3: squats holding onto rail x 20  4: standing hip flexion (marches) x 20 (B)  5: standing hip abduction (out to the side) x 20 (B)  6: standing hip extension (kick backwards) x 20 (B)  7: standing hamstring curls (Booty Kicks)  x 30 (B)  8: toe raises x 30    Standing Arm Exercises :   9: Shoulder Horizontal Abduction with paddles closed (Butterfly - Paddles meet in front of your body) x 20  10: Shoulder flexion/extension with paddles closed (paddles down to front of thighs) x 20  11: Shoulder abduction/adduction with paddles closed (Bird - Paddles to outside of thighs at your side) x 20    Seated Leg Exercises   12: Seated Marches x 30 (B)  13: Seated Kicks x 30 (B)  14: Seated bicycle 1 minute each way (forward and reverse)  15: seated scissor kicks x 1 minute  Stairs at end of session :   16: forward step ups x 20 (B)  17: Stretches on stairs with knee straight and with knee bent 4 x 15 sec hold